# Patient Record
Sex: MALE | Race: WHITE | NOT HISPANIC OR LATINO
[De-identification: names, ages, dates, MRNs, and addresses within clinical notes are randomized per-mention and may not be internally consistent; named-entity substitution may affect disease eponyms.]

---

## 2020-05-12 ENCOUNTER — TRANSCRIPTION ENCOUNTER (OUTPATIENT)
Age: 55
End: 2020-05-12

## 2020-12-10 ENCOUNTER — TRANSCRIPTION ENCOUNTER (OUTPATIENT)
Age: 55
End: 2020-12-10

## 2021-12-10 ENCOUNTER — TRANSCRIPTION ENCOUNTER (OUTPATIENT)
Age: 56
End: 2021-12-10

## 2022-09-19 ENCOUNTER — NON-APPOINTMENT (OUTPATIENT)
Age: 57
End: 2022-09-19

## 2022-10-10 ENCOUNTER — NON-APPOINTMENT (OUTPATIENT)
Age: 57
End: 2022-10-10

## 2024-04-18 ENCOUNTER — APPOINTMENT (OUTPATIENT)
Dept: ORTHOPEDIC SURGERY | Facility: CLINIC | Age: 59
End: 2024-04-18
Payer: COMMERCIAL

## 2024-04-18 VITALS — HEIGHT: 66 IN | BODY MASS INDEX: 24.91 KG/M2 | WEIGHT: 155 LBS

## 2024-04-18 DIAGNOSIS — Z78.9 OTHER SPECIFIED HEALTH STATUS: ICD-10-CM

## 2024-04-18 PROBLEM — Z00.00 ENCOUNTER FOR PREVENTIVE HEALTH EXAMINATION: Status: ACTIVE | Noted: 2024-04-18

## 2024-04-18 PROCEDURE — 73564 X-RAY EXAM KNEE 4 OR MORE: CPT | Mod: RT

## 2024-04-18 PROCEDURE — 99203 OFFICE O/P NEW LOW 30 MIN: CPT

## 2024-04-18 NOTE — ASSESSMENT
[FreeTextEntry1] : 04/18/2024: xrays 4 views RT knee showing no sig arthritic changes- normal Ligaments are stable. Advised to let pain guide activity.  Questions answered. Likely will cont to resolve with ice, rest, NSAIDs/OTC meds Follow up prn.  Entered by Marlene Schuler acting as a scribe
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
## 2024-04-18 NOTE — IMAGING
[de-identified] : RIGHT KNEE full motion mild posterior tenderness palpable bakers cyst 5/5 strength good pulses NVI

## 2024-05-02 ENCOUNTER — APPOINTMENT (OUTPATIENT)
Dept: ORTHOPEDIC SURGERY | Facility: CLINIC | Age: 59
End: 2024-05-02
Payer: COMMERCIAL

## 2024-05-02 DIAGNOSIS — M79.89 OTHER SPECIFIED SOFT TISSUE DISORDERS: ICD-10-CM

## 2024-05-02 DIAGNOSIS — M25.561 PAIN IN RIGHT KNEE: ICD-10-CM

## 2024-05-02 PROCEDURE — 99213 OFFICE O/P EST LOW 20 MIN: CPT

## 2024-05-02 NOTE — ASSESSMENT
[FreeTextEntry1] : 04/18/2024: xrays 4 views RT knee showing no sig arthritic changes- normal Ligaments are stable. Advised to let pain guide activity.  Questions answered. Likely will cont to resolve with ice, rest, NSAIDs/OTC meds Follow up prn.  05/02/2024: Treatment options reviewed. STAT doppler to r/o DVT. Reviewed activity modifications. May need vascular consult pending doppler results. Questions answered.  Progress note completed by Tashia Burton PA-C under the direct supervision of Mohit Blair MD.

## 2024-05-02 NOTE — IMAGING
[de-identified] : RIGHT KNEE RLE swelling full motion mild posterior tenderness palpable bakers cyst - pain with dorsiflexion 5/5 strength good pulses NVI
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
## 2024-08-21 ENCOUNTER — OFFICE (OUTPATIENT)
Dept: URBAN - METROPOLITAN AREA CLINIC 102 | Facility: CLINIC | Age: 59
Setting detail: OPHTHALMOLOGY
End: 2024-08-21
Payer: COMMERCIAL

## 2024-08-21 DIAGNOSIS — H02.834: ICD-10-CM

## 2024-08-21 DIAGNOSIS — H02.831: ICD-10-CM

## 2024-08-21 DIAGNOSIS — H53.40: ICD-10-CM

## 2024-08-21 PROBLEM — H52.4 PRESBYOPIA: Status: ACTIVE | Noted: 2024-08-21

## 2024-08-21 PROBLEM — Z41.1 ENCOUNTER FOR COSMETIC SURGERY: Status: ACTIVE | Noted: 2024-08-21

## 2024-08-21 PROCEDURE — 99204 OFFICE O/P NEW MOD 45 MIN: CPT | Performed by: OPHTHALMOLOGY

## 2024-08-21 PROCEDURE — 92081 LIMITED VISUAL FIELD XM: CPT | Performed by: OPHTHALMOLOGY

## 2024-08-21 PROCEDURE — 92285 EXTERNAL OCULAR PHOTOGRAPHY: CPT | Performed by: OPHTHALMOLOGY

## 2024-08-21 ASSESSMENT — LID POSITION - DERMATOCHALASIS
OS_DERMATOCHALASIS: LUL
OD_DERMATOCHALASIS: RUL

## 2024-08-21 ASSESSMENT — CONFRONTATIONAL VISUAL FIELD TEST (CVF)
OD_FINDINGS: FULL
OS_FINDINGS: FULL

## 2024-09-07 ENCOUNTER — DOCTOR'S OFFICE (OUTPATIENT)
Facility: LOCATION | Age: 59
Setting detail: OPHTHALMOLOGY
End: 2024-09-07
Payer: COMMERCIAL

## 2024-09-07 DIAGNOSIS — H04.122: ICD-10-CM

## 2024-09-07 DIAGNOSIS — H04.121: ICD-10-CM

## 2024-09-07 DIAGNOSIS — H04.123: ICD-10-CM

## 2024-09-07 DIAGNOSIS — H53.40: ICD-10-CM

## 2024-09-07 DIAGNOSIS — H02.834: ICD-10-CM

## 2024-09-07 DIAGNOSIS — H02.831: ICD-10-CM

## 2024-09-07 PROCEDURE — 92012 INTRM OPH EXAM EST PATIENT: CPT | Performed by: OPHTHALMOLOGY

## 2024-09-07 PROCEDURE — 83861 MICROFLUID ANALY TEARS: CPT | Mod: QW,RT | Performed by: OPHTHALMOLOGY

## 2024-09-07 PROCEDURE — 83861 MICROFLUID ANALY TEARS: CPT | Mod: QW,LT | Performed by: OPHTHALMOLOGY

## 2024-09-07 ASSESSMENT — LID POSITION - DERMATOCHALASIS
OS_DERMATOCHALASIS: LUL
OD_DERMATOCHALASIS: RUL

## 2024-09-07 ASSESSMENT — CONFRONTATIONAL VISUAL FIELD TEST (CVF)
OD_FINDINGS: FULL
OS_FINDINGS: FULL

## 2024-09-13 ENCOUNTER — DOCTOR'S OFFICE (OUTPATIENT)
Facility: LOCATION | Age: 59
Setting detail: OPHTHALMOLOGY
End: 2024-09-13
Payer: COMMERCIAL

## 2024-09-13 ENCOUNTER — RX ONLY (RX ONLY)
Age: 59
End: 2024-09-13

## 2024-09-13 DIAGNOSIS — H02.834: ICD-10-CM

## 2024-09-13 DIAGNOSIS — H02.831: ICD-10-CM

## 2024-09-13 PROCEDURE — 15823 BLEPHARP UPR EYELID XCSV SKN: CPT | Mod: 50 | Performed by: OPHTHALMOLOGY

## 2024-09-13 ASSESSMENT — CONFRONTATIONAL VISUAL FIELD TEST (CVF)
OD_FINDINGS: FULL
OS_FINDINGS: FULL

## 2024-09-20 ENCOUNTER — DOCTOR'S OFFICE (OUTPATIENT)
Facility: LOCATION | Age: 59
Setting detail: OPHTHALMOLOGY
End: 2024-09-20
Payer: COMMERCIAL

## 2024-09-20 ENCOUNTER — RX ONLY (RX ONLY)
Age: 59
End: 2024-09-20

## 2024-09-20 DIAGNOSIS — H02.831: ICD-10-CM

## 2024-09-20 DIAGNOSIS — H04.123: ICD-10-CM

## 2024-09-20 DIAGNOSIS — H02.834: ICD-10-CM

## 2024-09-20 PROCEDURE — 99024 POSTOP FOLLOW-UP VISIT: CPT | Performed by: OPHTHALMOLOGY

## 2024-09-20 ASSESSMENT — LID POSITION - DERMATOCHALASIS
OS_DERMATOCHALASIS: ABSENT
OD_DERMATOCHALASIS: ABSENT

## 2024-09-20 ASSESSMENT — LID POSITION - COMMENTS
OS_COMMENTS: INCISION INTACT
OD_COMMENTS: INCISION INTACT

## 2025-05-19 ENCOUNTER — NON-APPOINTMENT (OUTPATIENT)
Age: 60
End: 2025-05-19

## 2025-05-21 ENCOUNTER — APPOINTMENT (OUTPATIENT)
Dept: ORTHOPEDIC SURGERY | Facility: CLINIC | Age: 60
End: 2025-05-21